# Patient Record
Sex: MALE | Race: WHITE | Employment: STUDENT | URBAN - METROPOLITAN AREA
[De-identification: names, ages, dates, MRNs, and addresses within clinical notes are randomized per-mention and may not be internally consistent; named-entity substitution may affect disease eponyms.]

---
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
## 2018-01-05 ENCOUNTER — LAB ENCOUNTER (OUTPATIENT)
Dept: LAB | Facility: HOSPITAL | Age: 18
End: 2018-01-05
Attending: PEDIATRICS
Payer: COMMERCIAL

## 2018-01-05 DIAGNOSIS — R94.6 ABNORMAL RESULTS OF THYROID FUNCTION STUDIES: ICD-10-CM

## 2018-01-05 DIAGNOSIS — Q90.9 DOWN'S SYNDROME: ICD-10-CM

## 2018-01-05 DIAGNOSIS — R73.01 IMPAIRED FASTING GLUCOSE: Primary | ICD-10-CM

## 2018-01-05 LAB
BASOPHILS # BLD: 0 K/UL (ref 0–0.2)
BASOPHILS NFR BLD: 0 %
CHOLEST SERPL-MCNC: 218 MG/DL (ref 110–200)
EOSINOPHIL # BLD: 0 K/UL (ref 0–0.7)
EOSINOPHIL NFR BLD: 0 %
ERYTHROCYTE [DISTWIDTH] IN BLOOD BY AUTOMATED COUNT: 12.8 % (ref 11–15)
GLUCOSE SERPL-MCNC: 103 MG/DL (ref 70–99)
HBA1C MFR BLD: 5.3 % (ref 4–6)
HCT VFR BLD AUTO: 50.9 % (ref 41–52)
HDLC SERPL-MCNC: 27 MG/DL
HGB BLD-MCNC: 17.5 G/DL (ref 13.5–17.5)
LDLC SERPL CALC-MCNC: 138 MG/DL (ref 0–99)
LYMPHOCYTES # BLD: 16.1 K/UL (ref 1–4)
LYMPHOCYTES NFR BLD: 44 %
MCH RBC QN AUTO: 32.2 PG (ref 27–32)
MCHC RBC AUTO-ENTMCNC: 34.3 G/DL (ref 32–37)
MCV RBC AUTO: 93.9 FL (ref 80–100)
MONOCYTES # BLD: 0.5 K/UL (ref 0–1)
MONOCYTES NFR BLD: 2 %
NEUTROPHILS # BLD AUTO: 7.8 K/UL (ref 1.8–7.7)
NEUTROPHILS NFR BLD: 19 %
NEUTS BAND NFR BLD: 13 %
NONHDLC SERPL-MCNC: 191 MG/DL
NRBC BLD-RTO: 1 % (ref ?–1)
PLATELET # BLD AUTO: 128 K/UL (ref 140–400)
PMV BLD AUTO: 7.5 FL (ref 7.4–10.3)
RBC # BLD AUTO: 5.43 M/UL (ref 4.5–5.9)
T4 FREE SERPL-MCNC: 0.84 NG/DL (ref 0.58–1.64)
TRIGL SERPL-MCNC: 265 MG/DL (ref 1–149)
TSH SERPL-ACNC: 7.79 UIU/ML (ref 0.45–5.33)
VARIANT LYMPHS NFR BLD MANUAL: 22 %
WBC # BLD AUTO: 24.4 K/UL (ref 4–11)

## 2018-01-05 PROCEDURE — 84443 ASSAY THYROID STIM HORMONE: CPT

## 2018-01-05 PROCEDURE — 36415 COLL VENOUS BLD VENIPUNCTURE: CPT

## 2018-01-05 PROCEDURE — 85007 BL SMEAR W/DIFF WBC COUNT: CPT

## 2018-01-05 PROCEDURE — 82306 VITAMIN D 25 HYDROXY: CPT

## 2018-01-05 PROCEDURE — 83036 HEMOGLOBIN GLYCOSYLATED A1C: CPT

## 2018-01-05 PROCEDURE — 85060 BLOOD SMEAR INTERPRETATION: CPT

## 2018-01-05 PROCEDURE — 85025 COMPLETE CBC W/AUTO DIFF WBC: CPT

## 2018-01-05 PROCEDURE — 80061 LIPID PANEL: CPT

## 2018-01-05 PROCEDURE — 85027 COMPLETE CBC AUTOMATED: CPT

## 2018-01-05 PROCEDURE — 82947 ASSAY GLUCOSE BLOOD QUANT: CPT

## 2018-01-05 PROCEDURE — 84439 ASSAY OF FREE THYROXINE: CPT

## 2018-01-06 ENCOUNTER — TELEPHONE (OUTPATIENT)
Dept: PEDIATRICS CLINIC | Facility: CLINIC | Age: 18
End: 2018-01-06

## 2018-01-06 DIAGNOSIS — R79.89 ELEVATED TSH: ICD-10-CM

## 2018-01-06 DIAGNOSIS — R79.89 ABNORMAL CBC: Primary | ICD-10-CM

## 2018-01-06 NOTE — TELEPHONE ENCOUNTER
Repeat CBC due to abnormalities and if shows same thing will need to se hematology, mom aware    TSH high and lipids high they have appt with [facility] 1/30, added T3 free and total to labs already done

## 2018-01-09 LAB — 25(OH)D3 SERPL-MCNC: 24.4 NG/ML

## 2018-01-27 ENCOUNTER — HOSPITAL ENCOUNTER (INPATIENT)
Facility: HOSPITAL | Age: 18
LOS: 1 days | Discharge: ANOTHER HOSPITAL | DRG: 393 | End: 2018-01-27
Attending: EMERGENCY MEDICINE | Admitting: HOSPITALIST
Payer: COMMERCIAL

## 2018-01-27 ENCOUNTER — APPOINTMENT (OUTPATIENT)
Dept: ULTRASOUND IMAGING | Facility: HOSPITAL | Age: 18
DRG: 393 | End: 2018-01-27
Attending: HOSPITALIST
Payer: COMMERCIAL

## 2018-01-27 ENCOUNTER — APPOINTMENT (OUTPATIENT)
Dept: CT IMAGING | Facility: HOSPITAL | Age: 18
DRG: 393 | End: 2018-01-27
Attending: EMERGENCY MEDICINE
Payer: COMMERCIAL

## 2018-01-27 VITALS
HEIGHT: 62 IN | TEMPERATURE: 98 F | BODY MASS INDEX: 34.96 KG/M2 | OXYGEN SATURATION: 96 % | RESPIRATION RATE: 16 BRPM | WEIGHT: 190 LBS | DIASTOLIC BLOOD PRESSURE: 84 MMHG | SYSTOLIC BLOOD PRESSURE: 143 MMHG | HEART RATE: 108 BPM

## 2018-01-27 DIAGNOSIS — R65.10 SIRS (SYSTEMIC INFLAMMATORY RESPONSE SYNDROME) (HCC): ICD-10-CM

## 2018-01-27 DIAGNOSIS — K38.9 APPENDICOLITH: Primary | ICD-10-CM

## 2018-01-27 DIAGNOSIS — R10.31 RIGHT LOWER QUADRANT ABDOMINAL PAIN: ICD-10-CM

## 2018-01-27 PROBLEM — A41.9 SEPSIS (HCC): Status: ACTIVE | Noted: 2018-01-27

## 2018-01-27 PROBLEM — A41.9 SEPSIS, DUE TO UNSPECIFIED ORGANISM: Status: ACTIVE | Noted: 2018-01-27

## 2018-01-27 LAB
ALBUMIN SERPL BCP-MCNC: 3.6 G/DL (ref 3.5–4.8)
ALBUMIN/GLOB SERPL: 1.2 {RATIO} (ref 1–2)
ALP SERPL-CCNC: 77 U/L (ref 39–325)
ALT SERPL-CCNC: 42 U/L (ref 17–63)
ANION GAP SERPL CALC-SCNC: 9 MMOL/L (ref 0–18)
AST SERPL-CCNC: 39 U/L (ref 15–41)
BASOPHILS # BLD: 0 K/UL (ref 0–0.2)
BASOPHILS NFR BLD: 0 %
BILIRUB SERPL-MCNC: 0.6 MG/DL (ref 0.3–1.2)
BILIRUB UR QL: NEGATIVE
BLASTS # BLD MANUAL: 24.06 K/UL
BLASTS NFR BLD: 72 %
BUN SERPL-MCNC: 17 MG/DL (ref 8–20)
BUN/CREAT SERPL: 12.7 (ref 10–20)
CALCIUM SERPL-MCNC: 8.8 MG/DL (ref 8.5–10.5)
CD10 CELLS NFR SPEC: 100 %
CD117 CELLS NFR SPEC: 1 %
CD11C CELLS NFR SPEC: <1 %
CD13 CELLS NFR SPEC: <1 %
CD14 CELLS NFR SPEC: <1 %
CD19 CELLS NFR SPEC: 98 %
CD19/CD10 CELLS: 98 %
CD2 CELLS NFR SPEC: <1 %
CD20 CELLS NFR SPEC: 29 %
CD3 CELLS NFR SPEC: <1 %
CD33 CELLS NFR SPEC: 1 %
CD33/CD34 CELLS: 1 %
CD34 CELLS NFR SPEC: 98 %
CD45 CELLS NFR SPEC: 94 %
CD5 CELLS NFR SPEC: <1 %
CD56 CELLS NFR SPEC: <1 %
CD61 CELLS NFR SPEC: 1 %
CD64 CELLS NFR SPEC: <1 %
CD7 CELLS NFR SPEC: <1 %
CELL SURF LAMBDA LIGHT CHAIN: <1 %
CELL SURFACE KAPPA LIGHT CHAIN: <1 %
CHLORIDE SERPL-SCNC: 104 MMOL/L (ref 95–110)
CLARITY UR: CLEAR
CO2 SERPL-SCNC: 27 MMOL/L (ref 22–32)
COLOR UR: YELLOW
CREAT SERPL-MCNC: 1.34 MG/DL (ref 0.5–1.5)
EOSINOPHIL # BLD: 0 K/UL (ref 0–0.7)
EOSINOPHIL NFR BLD: 0 %
ERYTHROCYTE [DISTWIDTH] IN BLOOD BY AUTOMATED COUNT: 13.4 % (ref 11–15)
GLOBULIN PLAS-MCNC: 3.1 G/DL (ref 2.5–3.7)
GLUCOSE SERPL-MCNC: 114 MG/DL (ref 70–99)
GLUCOSE UR-MCNC: NEGATIVE MG/DL
HCT VFR BLD AUTO: 40.8 % (ref 41–52)
HGB BLD-MCNC: 13.9 G/DL (ref 13.5–17.5)
HGB UR QL STRIP.AUTO: NEGATIVE
HLA-DR+ CELLS NFR SPEC: 93 %
KETONES UR-MCNC: NEGATIVE MG/DL
LACTATE SERPL-SCNC: 2 MMOL/L (ref 0.5–2.2)
LEUKOCYTE ESTERASE UR QL STRIP.AUTO: NEGATIVE
LIPASE SERPL-CCNC: 23 U/L (ref 22–51)
LYMPHOCYTES # BLD: 5.7 K/UL (ref 1–4)
LYMPHOCYTES NFR BLD: 17 %
MCH RBC QN AUTO: 31.9 PG (ref 27–32)
MCHC RBC AUTO-ENTMCNC: 34 G/DL (ref 32–37)
MCV RBC AUTO: 93.8 FL (ref 80–100)
METAMYELOCYTES # BLD MANUAL: 0.33 K/UL
METAMYELOCYTES # BLD MANUAL: 0.33 K/UL
METAMYELOCYTES NFR BLD: 1 %
MONOCYTES # BLD: 0.3 K/UL (ref 0–1)
MONOCYTES NFR BLD: 1 %
MPO CELLS: 1 %
MYELOCYTES NFR BLD: 1 %
NEUTROPHILS # BLD AUTO: 2.7 K/UL (ref 1.8–7.7)
NEUTROPHILS NFR BLD: 5 %
NEUTS BAND NFR BLD: 3 %
NITRITE UR QL STRIP.AUTO: NEGATIVE
NRBC BLD-RTO: 1 % (ref ?–1)
OSMOLALITY UR CALC.SUM OF ELEC: 292 MOSM/KG (ref 275–295)
PH UR: 7 [PH] (ref 5–8)
PLATELET # BLD AUTO: 31 K/UL (ref 140–400)
PMV BLD AUTO: 8 FL (ref 7.4–10.3)
POTASSIUM SERPL-SCNC: 5.2 MMOL/L (ref 3.3–5.1)
PROT SERPL-MCNC: 6.7 G/DL (ref 5.9–8.4)
PROT UR-MCNC: NEGATIVE MG/DL
RBC # BLD AUTO: 4.35 M/UL (ref 4.5–5.9)
SODIUM SERPL-SCNC: 140 MMOL/L (ref 136–144)
SP GR UR STRIP: 1.02 (ref 1–1.03)
TDT CELLS: 79 %
UROBILINOGEN UR STRIP-ACNC: 2
VIT C UR-MCNC: NEGATIVE MG/DL
WBC # BLD AUTO: 33.4 K/UL (ref 4–11)

## 2018-01-27 PROCEDURE — 99223 1ST HOSP IP/OBS HIGH 75: CPT | Performed by: HOSPITALIST

## 2018-01-27 PROCEDURE — 99255 IP/OBS CONSLTJ NEW/EST HI 80: CPT | Performed by: INTERNAL MEDICINE

## 2018-01-27 PROCEDURE — 76705 ECHO EXAM OF ABDOMEN: CPT | Performed by: HOSPITALIST

## 2018-01-27 PROCEDURE — 99356 PROLONGED SERV,INPATIENT,1ST HR: CPT | Performed by: INTERNAL MEDICINE

## 2018-01-27 PROCEDURE — 74177 CT ABD & PELVIS W/CONTRAST: CPT | Performed by: EMERGENCY MEDICINE

## 2018-01-27 RX ORDER — ACETAMINOPHEN 325 MG/1
650 TABLET ORAL EVERY 6 HOURS PRN
Status: DISCONTINUED | OUTPATIENT
Start: 2018-01-27 | End: 2018-01-27

## 2018-01-27 RX ORDER — MORPHINE SULFATE 2 MG/ML
2 INJECTION, SOLUTION INTRAMUSCULAR; INTRAVENOUS EVERY 2 HOUR PRN
Refills: 0 | Status: SHIPPED | COMMUNITY
Start: 2018-01-27

## 2018-01-27 RX ORDER — MORPHINE SULFATE 4 MG/ML
4 INJECTION, SOLUTION INTRAMUSCULAR; INTRAVENOUS EVERY 2 HOUR PRN
Status: DISCONTINUED | OUTPATIENT
Start: 2018-01-27 | End: 2018-01-27

## 2018-01-27 RX ORDER — SODIUM CHLORIDE 9 MG/ML
INJECTION, SOLUTION INTRAVENOUS CONTINUOUS
Status: DISCONTINUED | OUTPATIENT
Start: 2018-01-27 | End: 2018-01-27

## 2018-01-27 RX ORDER — FAMOTIDINE 10 MG/ML
20 INJECTION, SOLUTION INTRAVENOUS 2 TIMES DAILY
Status: DISCONTINUED | OUTPATIENT
Start: 2018-01-27 | End: 2018-01-27

## 2018-01-27 RX ORDER — ONDANSETRON 2 MG/ML
4 INJECTION INTRAMUSCULAR; INTRAVENOUS EVERY 6 HOURS PRN
Refills: 0 | Status: SHIPPED | COMMUNITY
Start: 2018-01-27

## 2018-01-27 RX ORDER — MORPHINE SULFATE 2 MG/ML
2 INJECTION, SOLUTION INTRAMUSCULAR; INTRAVENOUS EVERY 2 HOUR PRN
Status: DISCONTINUED | OUTPATIENT
Start: 2018-01-27 | End: 2018-01-27

## 2018-01-27 RX ORDER — DEXTROSE AND SODIUM CHLORIDE 5; .45 G/100ML; G/100ML
INJECTION, SOLUTION INTRAVENOUS CONTINUOUS
Status: DISCONTINUED | OUTPATIENT
Start: 2018-01-27 | End: 2018-01-27

## 2018-01-27 RX ORDER — CIPROFLOXACIN 2 MG/ML
400 INJECTION, SOLUTION INTRAVENOUS EVERY 12 HOURS
Status: DISCONTINUED | OUTPATIENT
Start: 2018-01-27 | End: 2018-01-27

## 2018-01-27 RX ORDER — CIPROFLOXACIN 2 MG/ML
400 INJECTION, SOLUTION INTRAVENOUS EVERY 12 HOURS
Refills: 0 | Status: SHIPPED | COMMUNITY
Start: 2018-01-28

## 2018-01-27 RX ORDER — SODIUM CHLORIDE 9 MG/ML
INJECTION, SOLUTION INTRAVENOUS CONTINUOUS
Status: ACTIVE | OUTPATIENT
Start: 2018-01-27 | End: 2018-01-27

## 2018-01-27 RX ORDER — ONDANSETRON 2 MG/ML
4 INJECTION INTRAMUSCULAR; INTRAVENOUS EVERY 6 HOURS PRN
Status: DISCONTINUED | OUTPATIENT
Start: 2018-01-27 | End: 2018-01-27

## 2018-01-27 RX ORDER — 0.9 % SODIUM CHLORIDE 0.9 %
VIAL (ML) INJECTION
Status: COMPLETED
Start: 2018-01-27 | End: 2018-01-27

## 2018-01-27 RX ORDER — ACETAMINOPHEN 325 MG/1
650 TABLET ORAL EVERY 6 HOURS PRN
Refills: 0 | Status: SHIPPED | COMMUNITY
Start: 2018-01-27

## 2018-01-27 RX ORDER — FAMOTIDINE 10 MG/ML
20 INJECTION, SOLUTION INTRAVENOUS 2 TIMES DAILY
Refills: 0 | Status: SHIPPED | COMMUNITY
Start: 2018-01-27

## 2018-01-27 NOTE — CM/SW NOTE
Patient to transfer to [place]. Dr. Eliu Watkins arranging transfer, spoke on phone to  at [place]. Face sheet faxed to [place] ([phone]). ELBERT Tim and Femi Lopez aware.  RN aware that when patient is accepted transportation will need to be a

## 2018-01-27 NOTE — CONSULTS
[facility]    Report of Consultation    Hilary Ham Patient Status:  Inpatient    10/20/2000 MRN [id number]   Location [facility] 3W/SW Attending Merissa Brown MD   Hosp Day # 0 PCP Jorgito Oliva MD     Date of Admiss Other      Per NG; Multiple ( no family  h/o) yes   • Eye Problems Other      per NG; multiple ( no retina hx) yes   • Heart Disorder Other    • Diabetes Neg    • Glaucoma Neg        Social History  Social History    Marital status: Single              Spo documented as an outpatient  Musculoskeletal:  Neurological: Speech somewhat difficult to understand, patient able to ambulate to the bathroom  Behavioral/Psych:   Endocrine: Hypothyroidism based on lab test as an outpatient  Allergic/Immunologic: No known Correlate with urinalysis results. 4. There is a small amount of free pelvic fluid. 5. A 1.5 cm enhancing lesion right hepatic lobe is not completely characterized, but might represent a hemangioma. 6. Hepatomegaly.   7. Lesser incidental findings as abo

## 2018-01-27 NOTE — ED PROVIDER NOTES
Patient Seen in: [facility] AND [facility] Emergency Department    History   Patient presents with:  Abdomen/Flank Pain (GI/)      HPI    Patient with a history of Down syndrome presents with his mother for intermittent right-sided abdominal pain for the past vital signs reviewed. Social History and Family History elements reviewed from today, pertinent positives to the presenting problem noted.     Physical Exam     ED Triage Vitals  BP: 121/73 [01/27/18 0251]  Pulse: 113 [01/27/18 0251]  Resp: 14 [01/27/1 Abnormality         Status                     ---------                               -----------         ------                     CBC W/ DIFFERENTIAL[[id number]]          Abnormal            Preliminary result           Please view results for these miryam artifact does obscure fine detail. The bowel is nonobstructive. There is a 9 x 8 mm appendicolith within the distal appendix. The distal lumen measures 6-7 mm in diameter.    No identifiable surrounding inflammatory changes to suggest acute appendici administered)   dextrose 5 %-0.45 % NaCl infusion (not administered)   iopamidol (ISOVUE-M) 76 % injection 100 mL (70 mL Intravenous Given 1/27/18 0419)         MDM      01/27/18  0251 01/27/18  0500 01/27/18  0637   BP: 121/73 114/70 133/81   BP Location: leukocytosis and inflammation in the right of the abdomen. Unclear cause. Discussed with Dr. Sonja Rivera for admission and Dr. Madaline Goldmann for surgical consultation. Patient started on Zosyn in the ED and given aggressive IV fluids.     Condition upon leaving the d

## 2018-01-27 NOTE — PROGRESS NOTES
Patient seen and examined. Patient with elevated WBC with elevated blast cells and 5-10% neutrophils  Consulted Surgery and Urology given CT scan findings. Spoke to both - no urgent intervention at this time  Consulted ID - ID rec adding Cipro.  Appreciat

## 2018-01-27 NOTE — CONSULTS
[facility] ID CONSULT NOTE    [facility] Patient Status:  Inpatient    10/20/2000 MRN [id number]   Location [facility] 3W/SW Attending Filippo Cr MD   Hosp Day # 0 PCP Halle Nunez MD       Reason tobacco.    Allergies:  No Known Allergies    Medications:    Current Facility-Administered Medications:   •  Piperacillin Sod-Tazobactam So (ZOSYN) 3.375 g in dextrose 5 % 100 mL ADD-vantage, 3.375 g, Intravenous, Q8H  •  ondansetron HCl (ZOFRAN) injectio that began the last few days. The history is obtained from the patient's mother. She states that they were recently on a one week [employer] cruise and returned last week, and that since then he has been more fatigued.  She states that the patient escalante

## 2018-01-27 NOTE — PROGRESS NOTES
[facility] and spoke with Dr. Sarabjit Bland and the transfer center .   They will call back regarding bed availability

## 2018-01-27 NOTE — H&P
[facility] Patient Status:  Emergency    10/20/2000 MRN [id number]   Location [facility] Attending Henry Prieto MD   Hosp Day # 0 PCP Edmar Damian MD [address]    General:  Alert  Diffuse skin problem:  None. Eye:  Pupils are equal, round and reactive to light, extraocular movements are intact, Normal conjunctiva. HENT:  Normocephalic, oral mucosa is moist.  Head:  Normocephalic, atraumatic.   Neck:  Supp care    Prophylaxis  SCDs, heparin on hold till patient evaluated by surgery    CODE STATUS  Full    Primary care physician  Halle Nunez MD    Disposition  Clinical course will dictate outcome      Wilmer Hardin MD  1/27/2018  6:03 AM

## 2018-01-28 NOTE — PLAN OF CARE
Problem: SAFETY ADULT - FALL  Goal: Free from fall injury  INTERVENTIONS:  - Assess pt frequently for physical needs  - Identify cognitive and physical deficits and behaviors that affect risk of falls.   - [place] fall precautions as indicated by assessme

## 2018-01-28 NOTE — PROGRESS NOTES
Call from Dr. Rei Jeronimo  They have trials available for patients < 27years old. Arrangements are being made for transfer tonight.

## 2018-01-28 NOTE — CONSULTS
[facility]    PATIENT'S NAME: Bessie Marlow   ATTENDING PHYSICIAN: Meka Gomez MD   CONSULTING PHYSICIAN: Tyler Muñiz MD   PATIENT ACCOUNT#:   [id number]    LOCATION:  [address] [place] [address] #:   [id number]       DATE OF is not completely characterized but it might most likely represent hemangioma. There is hepatomegaly and a 1.1 cm appendicolith, but no inflammatory process.   With these findings, I was called by Dr. Madison Iraheta initially and discussed then the case with  of a cough or wheezing. GASTROINTESTINAL: Positive for constipation. Negative for gastroesophageal reflux. GENITOURINARY: Negative for difficulty in urination. MUSCULOSKELETAL: Negative for traumatic or arthritic disorders.   ENDOCRINE: Negative for hector

## 2018-02-08 ENCOUNTER — MED REC SCAN ONLY (OUTPATIENT)
Dept: PEDIATRICS CLINIC | Facility: CLINIC | Age: 18
End: 2018-02-08

## 2018-02-15 ENCOUNTER — MED REC SCAN ONLY (OUTPATIENT)
Dept: PEDIATRICS CLINIC | Facility: CLINIC | Age: 18
End: 2018-02-15

## 2018-04-28 ENCOUNTER — MED REC SCAN ONLY (OUTPATIENT)
Dept: PEDIATRICS CLINIC | Facility: CLINIC | Age: 18
End: 2018-04-28

## 2018-05-03 ENCOUNTER — MED REC SCAN ONLY (OUTPATIENT)
Dept: PEDIATRICS CLINIC | Facility: CLINIC | Age: 18
End: 2018-05-03

## 2018-06-01 ENCOUNTER — MED REC SCAN ONLY (OUTPATIENT)
Dept: PEDIATRICS CLINIC | Facility: CLINIC | Age: 18
End: 2018-06-01

## 2018-06-28 ENCOUNTER — MED REC SCAN ONLY (OUTPATIENT)
Dept: PEDIATRICS CLINIC | Facility: CLINIC | Age: 18
End: 2018-06-28

## 2018-07-11 PROBLEM — C91.01 ACUTE LYMPHOBLASTIC LEUKEMIA (ALL) IN REMISSION (HCC): Status: ACTIVE | Noted: 2018-01-28

## 2018-07-19 ENCOUNTER — MED REC SCAN ONLY (OUTPATIENT)
Dept: PEDIATRICS CLINIC | Facility: CLINIC | Age: 18
End: 2018-07-19

## 2019-02-07 NOTE — ED NOTES
Torres from radiology--- Large appendicolith, not appendicitis. Right kidney-- inflammatory changes, suspected pyelo, or colon inflammation. [Negative] : Heme/Lymph

## 2019-02-12 ENCOUNTER — HOSPITAL ENCOUNTER (OUTPATIENT)
Age: 19
Discharge: HOME | End: 2019-02-12
Payer: COMMERCIAL

## 2019-02-12 VITALS
TEMPERATURE: 98 F | WEIGHT: 153 LBS | DIASTOLIC BLOOD PRESSURE: 60 MMHG | HEART RATE: 93 BPM | OXYGEN SATURATION: 99 % | SYSTOLIC BLOOD PRESSURE: 139 MMHG | RESPIRATION RATE: 18 BRPM | BODY MASS INDEX: 28 KG/M2

## 2019-02-12 DIAGNOSIS — H00.014 HORDEOLUM EXTERNUM OF LEFT UPPER EYELID: Primary | ICD-10-CM

## 2019-02-12 PROCEDURE — 99213 OFFICE O/P EST LOW 20 MIN: CPT

## 2019-02-12 PROCEDURE — 99214 OFFICE O/P EST MOD 30 MIN: CPT

## 2019-02-12 RX ORDER — ERYTHROMYCIN 5 MG/G
1 OINTMENT OPHTHALMIC EVERY 6 HOURS
Qty: 1 G | Refills: 0 | Status: SHIPPED | OUTPATIENT
Start: 2019-02-12 | End: 2019-02-19

## 2019-02-12 NOTE — ED PROVIDER NOTES
Patient presents with:  Eye Problem      HPI:     Nara Maddox is a 19year old male who presents today with a chief complaint of a stye to the left upper eyelid since this morning. There is a red pimple-like lesion present.   There is no surroundi file      Sexual activity: Not on file    Other Topics      Concerns:        Not on file    Social History Narrative      Not on file        ROS:   Positive for stated complaint: stye left upper eyelid  All other systems reviewed and negative except as not [address] [facility] [address] [phone]    Schedule an appointment as soon as possible for a visit in 2 days

## 2019-04-08 ENCOUNTER — OFFICE VISIT (OUTPATIENT)
Dept: OPHTHALMOLOGY | Facility: CLINIC | Age: 19
End: 2019-04-08
Payer: COMMERCIAL

## 2019-04-08 DIAGNOSIS — H52.13 MYOPIA OF BOTH EYES WITH ASTIGMATISM: ICD-10-CM

## 2019-04-08 DIAGNOSIS — H52.203 MYOPIA OF BOTH EYES WITH ASTIGMATISM: ICD-10-CM

## 2019-04-08 DIAGNOSIS — C91.01 ACUTE LYMPHOBLASTIC LEUKEMIA (ALL) IN REMISSION (HCC): ICD-10-CM

## 2019-04-08 DIAGNOSIS — Q90.9 DOWN'S SYNDROME: Chronic | ICD-10-CM

## 2019-04-08 DIAGNOSIS — H50.00 ESOTROPIA: Primary | Chronic | ICD-10-CM

## 2019-04-08 PROCEDURE — 92014 COMPRE OPH EXAM EST PT 1/>: CPT | Performed by: OPHTHALMOLOGY

## 2019-04-08 PROCEDURE — 92015 DETERMINE REFRACTIVE STATE: CPT | Performed by: OPHTHALMOLOGY

## 2019-04-08 RX ORDER — LEUCOVORIN CALCIUM 10 MG/1
10 TABLET ORAL DAILY
COMMUNITY

## 2019-04-08 RX ORDER — FUROSEMIDE 20 MG/1
20 TABLET ORAL 2 TIMES DAILY
COMMUNITY

## 2019-04-08 RX ORDER — MULTIVIT-MIN/IRON/FOLIC ACID/K 18-600-40
CAPSULE ORAL
COMMUNITY

## 2019-04-08 RX ORDER — MERCAPTOPURINE 50 MG/1
50 TABLET ORAL DAILY
COMMUNITY

## 2019-04-08 RX ORDER — SPIRONOLACTONE 50 MG/1
50 TABLET, FILM COATED ORAL DAILY
COMMUNITY

## 2019-04-08 RX ORDER — PREDNISONE 1 MG/1
35 TABLET ORAL
COMMUNITY

## 2019-04-08 NOTE — PROGRESS NOTES
En James is a 19year old male. HPI:     HPI     EP/ 19 yr old here for a complete exam. LDE 11/6/17 with Hx of Alt. Esotopia, blepharitis, myopia, astigmatism and has Down's Syndrome. Patient is currently in remission from leukemia.  Patient Take 50 mg by mouth daily. Disp:  Rfl:    furosemide 20 MG Oral Tab Take 20 mg by mouth 2 (two) times daily. Disp:  Rfl:    Cholecalciferol (VITAMIN D) 2000 units Oral Cap Take by mouth.  Disp:  Rfl:    methotrexate 2.5 MG Oral Tab Take 2.5 mg by mouth once Right Left    External Normal Normal          Slit Lamp Exam       Right Left    Lids/Lashes Normal Normal    Conjunctiva/Sclera Normal Normal    Cornea Clear Clear    Anterior Chamber Deep and quiet Deep and quiet    Iris Normal Normal    Lens Clear Clear symptoms worsen or fail to improve, for Complete exam.    4/8/2019  Scribed by: Kishore Lemus.  Niru Norton MD

## 2019-04-08 NOTE — ASSESSMENT & PLAN NOTE
Continue glasses. Alternating Esotropia measuring more than last visit. Will follow. Patient being treated for ALL. In remission now.

## 2019-04-08 NOTE — PATIENT INSTRUCTIONS
Acute lymphoblastic leukemia (ALL) in remission ([facility] [address]  Currently in remission on maintenance chemo. Under the care of Dr. Boubacar Contreras at [facility]. Alternating esotropia  Alternating. Larger deviation today than last visit on 11/17, but Mom feels no change.     Rickie

## 2019-04-08 NOTE — ASSESSMENT & PLAN NOTE
Currently in remission on maintenance chemo. Under the care of Dr. Yesica Ingram at [facility].

## 2019-12-10 ENCOUNTER — MED REC SCAN ONLY (OUTPATIENT)
Dept: PEDIATRICS CLINIC | Facility: CLINIC | Age: 19
End: 2019-12-10

## 2020-01-10 ENCOUNTER — MED REC SCAN ONLY (OUTPATIENT)
Dept: PEDIATRICS CLINIC | Facility: CLINIC | Age: 20
End: 2020-01-10

## 2021-12-03 ENCOUNTER — OFFICE VISIT (OUTPATIENT)
Dept: OPHTHALMOLOGY | Facility: CLINIC | Age: 21
End: 2021-12-03
Payer: MEDICARE

## 2021-12-03 DIAGNOSIS — H01.02B SQUAMOUS BLEPHARITIS OF UPPER AND LOWER EYELIDS OF BOTH EYES: ICD-10-CM

## 2021-12-03 DIAGNOSIS — H52.13 MYOPIA OF BOTH EYES WITH ASTIGMATISM: ICD-10-CM

## 2021-12-03 DIAGNOSIS — H01.02A SQUAMOUS BLEPHARITIS OF UPPER AND LOWER EYELIDS OF BOTH EYES: ICD-10-CM

## 2021-12-03 DIAGNOSIS — H50.05 ALTERNATING ESOTROPIA: Primary | ICD-10-CM

## 2021-12-03 DIAGNOSIS — H52.203 MYOPIA OF BOTH EYES WITH ASTIGMATISM: ICD-10-CM

## 2021-12-03 DIAGNOSIS — Q90.9 DOWN'S SYNDROME: Chronic | ICD-10-CM

## 2021-12-03 DIAGNOSIS — H25.043 POSTERIOR SUBCAPSULAR POLAR AGE-RELATED CATARACT OF BOTH EYES: ICD-10-CM

## 2021-12-03 PROCEDURE — 92015 DETERMINE REFRACTIVE STATE: CPT | Performed by: OPHTHALMOLOGY

## 2021-12-03 PROCEDURE — 92014 COMPRE OPH EXAM EST PT 1/>: CPT | Performed by: OPHTHALMOLOGY

## 2021-12-03 PROCEDURE — 92060 SENSORIMOTOR EXAMINATION: CPT | Performed by: OPHTHALMOLOGY

## 2021-12-03 NOTE — PATIENT INSTRUCTIONS
Alternating esotropia  Mom considering surgery for esotropia. Info for Dr. Bianca Amanda given. Down's syndrome  Stable. In remission from ALL. Treated at [facility] by Dr. Sun Elliott.     Myopia of both eyes with astigmatism  New glasses    Posterior subcapsular polar

## 2021-12-05 NOTE — PROGRESS NOTES
Kristine Cullen is a 19year old male. HPI:     HPI     EP/ 19 yr old here for a complete exam. LDE 4/8/2019 with Hx of Alt. Esotopia, blepharitis, myopia, astigmatism and has Down's Syndrome. Patient is currently in remission from leukemia.  Tex Take by mouth. • methotrexate 2.5 MG Oral Tab Take 2.5 mg by mouth once a week. • mercaptopurine 50 MG Oral Tab Take 50 mg by mouth daily. • Leucovorin Calcium 10 MG Oral Tab Take 10 mg by mouth daily.      • morphINE sulfate, PF, 2 MG/ML Venkatesh Young cataract 2+ Posterior subcapsular cataract    Vitreous Clear Clear          Fundus Exam       Right Left    Disc Normal- quick view flat, sharp margins, good color- Normal- quick view flat, sharp margins, good color-    C/D Ratio 0.0 0.0    Macula Normal N

## 2023-01-13 ENCOUNTER — OFFICE VISIT (OUTPATIENT)
Dept: OPHTHALMOLOGY | Facility: CLINIC | Age: 23
End: 2023-01-13

## 2023-01-13 DIAGNOSIS — H25.043 POSTERIOR SUBCAPSULAR POLAR AGE-RELATED CATARACT OF BOTH EYES: Primary | ICD-10-CM

## 2023-01-13 DIAGNOSIS — H01.02A SQUAMOUS BLEPHARITIS OF UPPER AND LOWER EYELIDS OF BOTH EYES: ICD-10-CM

## 2023-01-13 DIAGNOSIS — H50.05 ALTERNATING ESOTROPIA: ICD-10-CM

## 2023-01-13 DIAGNOSIS — Q90.9 DOWN'S SYNDROME: Chronic | ICD-10-CM

## 2023-01-13 DIAGNOSIS — H52.13 MYOPIA OF BOTH EYES WITH ASTIGMATISM: ICD-10-CM

## 2023-01-13 DIAGNOSIS — H01.02B SQUAMOUS BLEPHARITIS OF UPPER AND LOWER EYELIDS OF BOTH EYES: ICD-10-CM

## 2023-01-13 DIAGNOSIS — H52.203 MYOPIA OF BOTH EYES WITH ASTIGMATISM: ICD-10-CM

## 2023-01-13 PROCEDURE — 92015 DETERMINE REFRACTIVE STATE: CPT | Performed by: OPHTHALMOLOGY

## 2023-01-13 PROCEDURE — 92014 COMPRE OPH EXAM EST PT 1/>: CPT | Performed by: OPHTHALMOLOGY

## 2023-01-13 NOTE — ASSESSMENT & PLAN NOTE
Probably secondary to steroids for ALL treatment. Noticed in 2021. Slight increase in size, but patient doing well. See Dr. Delores Davis.

## 2023-01-13 NOTE — PATIENT INSTRUCTIONS
Myopia of both eyes with astigmatism  New Rx. Down's syndrome  Stable. In remission from ALL. Treated at [facility] by Dr. Morgan Miller. 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 Noticed in 2021. Slight increase in size, but patient doing well. See Dr. Jose J Johnson. Alternating esotropia  Mom considering surgery for esotropia. Info for Dr. Jose J Johnson given. Blepharitis of both eyes  Better. Continue lid hygiene as needed.

## 2023-07-14 ENCOUNTER — OFFICE VISIT (OUTPATIENT)
Dept: OPHTHALMOLOGY | Facility: CLINIC | Age: 23
End: 2023-07-14

## 2023-07-14 DIAGNOSIS — H01.02A SQUAMOUS BLEPHARITIS OF UPPER AND LOWER EYELIDS OF BOTH EYES: ICD-10-CM

## 2023-07-14 DIAGNOSIS — H52.203 MYOPIA OF BOTH EYES WITH ASTIGMATISM: ICD-10-CM

## 2023-07-14 DIAGNOSIS — H25.043 POSTERIOR SUBCAPSULAR POLAR AGE-RELATED CATARACT OF BOTH EYES: ICD-10-CM

## 2023-07-14 DIAGNOSIS — H01.02B SQUAMOUS BLEPHARITIS OF UPPER AND LOWER EYELIDS OF BOTH EYES: ICD-10-CM

## 2023-07-14 DIAGNOSIS — H52.13 MYOPIA OF BOTH EYES WITH ASTIGMATISM: ICD-10-CM

## 2023-07-14 DIAGNOSIS — H50.05 ALTERNATING ESOTROPIA: Primary | ICD-10-CM

## 2023-07-14 DIAGNOSIS — Q90.9 DOWN'S SYNDROME: Chronic | ICD-10-CM

## 2023-07-14 PROCEDURE — 92012 INTRM OPH EXAM EST PATIENT: CPT | Performed by: OPHTHALMOLOGY

## 2023-07-14 PROCEDURE — 92060 SENSORIMOTOR EXAMINATION: CPT | Performed by: OPHTHALMOLOGY

## 2023-07-14 NOTE — ASSESSMENT & PLAN NOTE
Probably secondary to steroids for ALL treatment. Noticed in 2021. Slight increase in size, but patient doing well.

## 2023-07-14 NOTE — ASSESSMENT & PLAN NOTE
Mom considering surgery for esotropia. Info for Dr. Delores Davis given. However, mom feels eye crossing getting better. Not as noticeable.

## 2023-07-14 NOTE — PATIENT INSTRUCTIONS
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 Noticed in 2021. Slight increase in size, but patient doing well. Myopia of both eyes with astigmatism  Same glasses. Patient will wear outside only. Blepharitis of both eyes  Better. Continue lid hygiene as needed. Alternating esotropia  Mom considering surgery for esotropia. Info for Dr. Hall Gins given. However, mom feels eye crossing getting better. Not as noticeable. Down's syndrome  Stable. In remission from ALL.  Treated at [facility] by Dr. Olga Castellano.

## 2023-07-16 NOTE — PROGRESS NOTES
Merry Soni is a 23year old male. HPI:     HPI    EP/ 23year old M here for a recheck of vision and motility. LDE: 1/13/23 with a history of Down's Syndrome, myopia with astigmatism in both eyes, alternating esotropia, posterior subcapsular polar- age related cataract in both eyes and blepharitis. Mom states pt wears his glasses, mainly when going outside of the house. Patient has not seen Dr. Maynor Alexander. Pt is still being followed by Pediatric Oncologist Dr. Adonay Vail at [facility]- pt was diagnosed with leukemia in Jan. 2018. Pt follows up every 4 months for blood work and was just seen on 6/07/23. Last visit patient was given information to see Dr. Abran Jones edited by Raffaele Pinto OT on 7/14/2023 10:13 AM.        Patient History:  Past Medical History:   Diagnosis Date    Alternating esotropia 11/10/2014    Cancer [facility])     Down syndrome 1998    Xray to rule out Atlanto axial instabilty done 10- normal    Esotropia 2000    Myopia with astigmatism 2000    Stress fracture of metatarsal bone of left foot        Surgical History: Merry Soni has a past surgical history that includes removal adenoids,primary,<13 y/o (Bilateral).     Family History   Problem Relation Age of Onset    Cancer Father         per NG; Hodgkins, Stomach    Other (Other) Father     Macular degeneration Other         Per NG; Multiple ( no family  h/o) yes    Eye Problems Other         per NG; multiple ( no retina hx) yes    Heart Disorder Other     Diabetes Neg     Glaucoma Neg        Social History:   Social History     Socioeconomic History    Marital status: Single   Tobacco Use    Smoking status: Never    Smokeless tobacco: Never       Medications:  Current Outpatient Medications   Medication Sig Dispense Refill    Cholecalciferol (VITAMIN D) 2000 units Oral Cap Take by mouth.      predniSONE 1 MG Oral Tab Take 35 mg by mouth daily with breakfast. (Patient not taking: Reported on 7/14/2023)      spironolactone 50 MG Oral Tab Take 50 mg by mouth daily. (Patient not taking: Reported on 7/14/2023)      furosemide 20 MG Oral Tab Take 20 mg by mouth 2 (two) times daily. (Patient not taking: Reported on 7/14/2023)      methotrexate 2.5 MG Oral Tab Take 2.5 mg by mouth once a week. (Patient not taking: Reported on 7/14/2023)      mercaptopurine 50 MG Oral Tab Take 50 mg by mouth daily. (Patient not taking: Reported on 7/14/2023)      Leucovorin Calcium 10 MG Oral Tab Take 10 mg by mouth daily. (Patient not taking: Reported on 7/14/2023)      morphINE sulfate, PF, 2 MG/ML Intravenous Solution Inject 1 mL (2 mg total) into the vein every 2 (two) hours as needed. (Patient not taking: Reported on 7/14/2023)  0    acetaminophen 325 MG Oral Tab Take 2 tablets (650 mg total) by mouth every 6 (six) hours as needed. (Patient not taking: Reported on 7/14/2023)  0    ondansetron HCl 4 MG/2ML Injection Solution Inject 2 mL (4 mg total) into the vein every 6 (six) hours as needed. (Patient not taking: Reported on 7/14/2023)  0    Ciprofloxacin in D5W 400 MG/200ML Intravenous Solution Inject 200 mL (400 mg total) into the vein every 12 (twelve) hours. (Patient not taking: Reported on 7/14/2023)  0    dextrose 5 % SOLN 100 mL with Piperacillin Sod-Tazobactam So 3.375 (3-0.375) g SOLR 3.375 g Inject 3.375 g into the vein every 8 (eight) hours. (Patient not taking: Reported on 7/14/2023)  0    famoTIDine 20 MG/2ML Intravenous Solution Inject 2 mL (20 mg total) into the vein 2 (two) times daily. (Patient not taking: Reported on 7/14/2023)  0       Allergies:  No Known Allergies    ROS:       PHYSICAL EXAM:     Base Eye Exam       Visual Acuity [employer])         Right Left    Dist cc 20/30 20/30    Near cc 20/30 20/30              Tonometry       Unable to assess:  Yes              Pupils         Pupils APD    Right PERRL None    Left PERRL None              Visual Fields (Counting fingers)         Left Right Full Full              Extraocular Movement         Right Left     Full Full                  Additional Tests       Fusional Vergence       Near point of convergence: To the nose                  Strabismus Exam       Correction: cc      Distance Near Near +3DS N Bifocals    AET 35 AET' 25      RET  RET'             Slit Lamp and Fundus Exam       External Exam         Right Left    External Normal Normal              Slit Lamp Exam         Right Left    Lids/Lashes minimal blepharitis minimal blepharitis    Conjunctiva/Sclera Normal Normal    Cornea Clear Clear    Anterior Chamber Deep and quiet Deep and quiet    Iris Normal Normal    Lens 2+ Posterior subcapsular cataract 2+ Posterior subcapsular cataract    Vitreous Clear Clear              Fundus Exam         Right Left    Disc Normal Normal    Good red reflex both eyes  Undilated                  Refraction       Wearing Rx         Sphere Cylinder Axis    Right -2.50 +3.50 090    Left -3.00 +3.75 090      Type: Single vision                     ASSESSMENT/PLAN:     Diagnoses and Plan:     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 Noticed in 2021. Slight increase in size, but patient doing well. Myopia of both eyes with astigmatism  Same glasses. Patient will wear outside only. Blepharitis of both eyes  Better. Continue lid hygiene as needed. Alternating esotropia  Mom considering surgery for esotropia. Info for Dr. Aldair Flores given. However, mom feels eye crossing getting better. Not as noticeable. Down's syndrome  Stable. In remission from ALL. Treated at [facility] by Dr. Lesia Rodriguez. No orders of the defined types were placed in this encounter.       Meds This Visit:  Requested Prescriptions      No prescriptions requested or ordered in this encounter        Follow up instructions:  Return in about 6 months (around 1/14/2024), or if symptoms worsen or fail to improve, for Complete exam.    7/16/2023  Scribed by: Jacek Bryant MD

## 2023-07-16 NOTE — ASSESSMENT & PLAN NOTE
Stable. In remission from ALL.  Treated at [facility] by Dr. Katina Lewis.

## 2024-01-08 ENCOUNTER — TELEPHONE (OUTPATIENT)
Dept: OPHTHALMOLOGY | Facility: CLINIC | Age: 24
End: 2024-01-08

## (undated) NOTE — IP AVS SNAPSHOT
Patient Demographics     Address  [address] [place] [address] [address]  [address] [address] Phone  [phone] [facility]) *Preferred*  [phone] (Work)  [phone] [facility]) E-mail Address  [email or web address]      Emergency Contact(s)     Name Relation Home Work Mobile [phone] 0.9%  NaCl infusion 01/27/18 1117 New Bag      [id number] Ciprofloxacin in D5W (CIPRO) IVPB premix SOLN 400 mg 01/27/18 1206 New Bag      [id number] Piperacillin Sod-Tazobactam So (ZOSYN) 3.375 g in dextrose 5 % 100 mL ADD-vantage 01/27/18 0603 Ne Type Source Collected On   Blood — 01/27/18 1005          Components    Component Value Reference Range Flag Lab   Flow Cytometry Specimen Peripheral blood   — — [facility]   Comment:    Viability: 90%     FLOW CYTOMETRY RESULTS -- [facility]   Res For  purposes, a second pathologist has reviewed the case and concurs with the above diagnostic interpretation. Case findings were discussed with León Curtis and Lisbeth Guerrier on 1/27/18 at 2:25 p.m. and 2:28 p.m., respectively.        Re CD2 CELLS <1 % — [facility]   CD56 CELLS <1 % — [facility]   HLA-DR CELLS 93 % — [facility] FLOW CYTOMETRY MPO PANEL [[id number]]  Resulted: 01/27/18 1450, Result status: Final result   Ordering provider:  Babs Gupta MD  01/27/18 12 Immunophenotyping of peripheral blood by flow cytometric analysis demonstrates a predominant immature blast population characterized by expression of CD34, dim CD 45, HLA-DR, CD10, CD19, dim CD20, and TdT.     Overall, the cytomorphologic and immunophenotyp Alkaline Phosphatase 77 39 - 325 U/L [employer] [facility]   Bilirubin, Total 0.6 0.3 - 1.2 mg/dL [facility]   Total Protein 6.7 5.9 - 8.4 g/dL — [facility]   Albumin 3.6 3.5 - 4.8 g/dL — [facility]   Globulin 3.1 2.5 - 3.7 g/dL [employer]   A/G Ratio Nitrite Urine Negative Negative — [facility]   Urobilinogen Urine 2.0 <2.0 A [facility]   Leukocyte Esterase Urine Negative Negative — [facility]   Ascorbic Acid Urine Negative Negative mg/dL [employer] Microscopic not indicated — — Author:  Gege Ku MD Service:  (none) Author Type:  Physician    Filed:  1/27/2018  7:26 AM Date of Service:  1/27/2018  6:03 AM Status:  Signed    :  Gege Ku MD (Physician)       [facility]    History & Physical Unable to obtain secondary to patient's current mental status[SA.2]    Physical Exam:  Temp:  [99.2 °F (37.3 °C)-99.9 °F (37.7 °C)] 99.2 °F (37.3 °C)  Pulse:  [102-113] 102  Resp:  [14] 14  BP: (114-121)/(70-73) 114/70    General:  Alert  Diffuse skin prob Continue to monitor white count markedly elevated at this time. Patient started on Zosyn 3.375 g IV piggyback every 8 hours along with IV fluids. Will use morphine as needed for pain. Down syndrome  Continue supportive care[SA. 2]    Prophylaxis[SA. 1] and nonspecific inflammatory stranding in the right upper quadrant, and diffuse bladder wall thickening. Patient was cultured given IV hydration and begun on Zosyn. 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 2]    Results:     Laboratory Data:    Lab Results  Component Value Date   WBC 33.4 (H) 01/27/2018   HGB 13.9 01/27/201 Recommendations:[ME.1]  Will discuss transfer to a tertiary care facility. [ME.2]      Thank you for allowing me to participate in the care of your patient. Noemy Zelaya Roch  1/27/2018[ME.1]    Electronically signed by Donnie Bills MD on 1/27/2018

## (undated) NOTE — LETTER
Date & Time: 2/12/2019, 11:58 AM  Patient: [place]  Encounter Provider(s):    LORENZO Mccormick       To Whom It May Concern:    Aristides Akers was seen and treated in our department on 2/12/2019.  He can return to school without restr

## (undated) NOTE — IP AVS SNAPSHOT
[facility]            (For Outpatient Use Only) Initial Admit Date: 1/27/2018   Inpt/Obs Admit Date: Inpt: 1/27/18 / Obs: N/A   Discharge Date:    Marcio Galaviz:  [de-identified]   MRN: [de-identified]   CSN: [id number]        ENCOUNTER  Patient Class [place] [facility] [place]    January [place], 2018

## (undated) NOTE — Clinical Note
4/15/2017              Timothy Packer        [address] [place] [id number]         To Whom It May Concern,      Patient has trisomy 24 and he is cleared to participate in Soccer and other activities at this time.        Sincerely,